# Patient Record
Sex: MALE | Race: WHITE | HISPANIC OR LATINO | Employment: FULL TIME | URBAN - METROPOLITAN AREA
[De-identification: names, ages, dates, MRNs, and addresses within clinical notes are randomized per-mention and may not be internally consistent; named-entity substitution may affect disease eponyms.]

---

## 2021-09-04 ENCOUNTER — OFFICE VISIT (OUTPATIENT)
Dept: URGENT CARE | Facility: PHYSICIAN GROUP | Age: 66
End: 2021-09-04
Payer: MEDICARE

## 2021-09-04 ENCOUNTER — HOSPITAL ENCOUNTER (OUTPATIENT)
Facility: MEDICAL CENTER | Age: 66
End: 2021-09-04
Attending: FAMILY MEDICINE
Payer: MEDICARE

## 2021-09-04 VITALS
WEIGHT: 194 LBS | HEART RATE: 95 BPM | HEIGHT: 69 IN | OXYGEN SATURATION: 94 % | DIASTOLIC BLOOD PRESSURE: 88 MMHG | RESPIRATION RATE: 16 BRPM | TEMPERATURE: 102.4 F | SYSTOLIC BLOOD PRESSURE: 138 MMHG | BODY MASS INDEX: 28.73 KG/M2

## 2021-09-04 DIAGNOSIS — J11.1 INFLUENZA: ICD-10-CM

## 2021-09-04 DIAGNOSIS — R05.9 COUGH: ICD-10-CM

## 2021-09-04 DIAGNOSIS — R50.9 FEVER, UNSPECIFIED FEVER CAUSE: ICD-10-CM

## 2021-09-04 DIAGNOSIS — Z20.822 SUSPECTED COVID-19 VIRUS INFECTION: ICD-10-CM

## 2021-09-04 LAB
FLUAV+FLUBV AG SPEC QL IA: POSITIVE
INT CON NEG: NORMAL
INT CON POS: NORMAL

## 2021-09-04 PROCEDURE — U0003 INFECTIOUS AGENT DETECTION BY NUCLEIC ACID (DNA OR RNA); SEVERE ACUTE RESPIRATORY SYNDROME CORONAVIRUS 2 (SARS-COV-2) (CORONAVIRUS DISEASE [COVID-19]), AMPLIFIED PROBE TECHNIQUE, MAKING USE OF HIGH THROUGHPUT TECHNOLOGIES AS DESCRIBED BY CMS-2020-01-R: HCPCS

## 2021-09-04 PROCEDURE — 87804 INFLUENZA ASSAY W/OPTIC: CPT | Performed by: FAMILY MEDICINE

## 2021-09-04 PROCEDURE — U0005 INFEC AGEN DETEC AMPLI PROBE: HCPCS

## 2021-09-04 PROCEDURE — 99203 OFFICE O/P NEW LOW 30 MIN: CPT | Mod: CS | Performed by: FAMILY MEDICINE

## 2021-09-04 RX ORDER — BENZONATATE 100 MG/1
100 CAPSULE ORAL 3 TIMES DAILY PRN
Qty: 30 CAPSULE | Refills: 0 | Status: SHIPPED | OUTPATIENT
Start: 2021-09-04

## 2021-09-04 RX ORDER — ATORVASTATIN CALCIUM 10 MG/1
10 TABLET, FILM COATED ORAL NIGHTLY
COMMUNITY

## 2021-09-04 RX ORDER — ASPIRIN 81 MG/1
81 TABLET, CHEWABLE ORAL DAILY
COMMUNITY

## 2021-09-04 RX ORDER — SPIRONOLACTONE 25 MG/1
25 TABLET ORAL DAILY
COMMUNITY

## 2021-09-04 RX ORDER — TOPIRAMATE 25 MG/1
25 TABLET ORAL 2 TIMES DAILY
COMMUNITY

## 2021-09-04 RX ORDER — AMITRIPTYLINE HYDROCHLORIDE 10 MG/1
10 TABLET, FILM COATED ORAL NIGHTLY
COMMUNITY

## 2021-09-04 RX ORDER — PAROXETINE 30 MG/1
40 TABLET, FILM COATED ORAL DAILY
COMMUNITY

## 2021-09-04 RX ORDER — ALLOPURINOL 100 MG/1
100 TABLET ORAL DAILY
COMMUNITY

## 2021-09-04 RX ORDER — POTASSIUM CHLORIDE 600 MG/1
8 TABLET, FILM COATED, EXTENDED RELEASE ORAL 2 TIMES DAILY
COMMUNITY

## 2021-09-04 RX ORDER — EZETIMIBE 10 MG/1
10 TABLET ORAL DAILY
COMMUNITY

## 2021-09-04 RX ORDER — OSELTAMIVIR PHOSPHATE 75 MG/1
75 CAPSULE ORAL 2 TIMES DAILY
Qty: 10 CAPSULE | Refills: 0 | Status: SHIPPED | OUTPATIENT
Start: 2021-09-04 | End: 2021-09-09

## 2021-09-04 RX ORDER — ENALAPRIL MALEATE 10 MG/1
10 TABLET ORAL DAILY
COMMUNITY

## 2021-09-04 ASSESSMENT — ENCOUNTER SYMPTOMS
FEVER: 1
COUGH: 1
DIZZINESS: 0
VOMITING: 0
CHILLS: 0
NAUSEA: 0
SORE THROAT: 0
MYALGIAS: 1
RHINORRHEA: 1
SHORTNESS OF BREATH: 0

## 2021-09-04 NOTE — PROGRESS NOTES
Subjective:   Ross Bowman is a 65 y.o. male who presents for Cough (runny nose, body aches, headache, pressure, x1 day. )        Cough  This is a new problem. The current episode started yesterday. The problem has been unchanged. The cough is non-productive. Associated symptoms include a fever, myalgias and rhinorrhea. Pertinent negatives include no chills, rash, sore throat or shortness of breath. Associated symptoms comments: There has been community-wide COVID-19 exposure, the patient denies known direct COVID-19 exposure  Patient reports complete COVID-19 vaccination.     PMH:  has no past medical history on file.  MEDS:   Current Outpatient Medications:   •  potassium chloride (KLOR-CON) 8 MEQ tablet, Take 8 mEq by mouth 2 times a day., Disp: , Rfl:   •  Fenofibrate (TRICOR PO), Take  by mouth., Disp: , Rfl:   •  ezetimibe (ZETIA) 10 MG Tab, Take 10 mg by mouth every day., Disp: , Rfl:   •  aspirin (ASA) 81 MG Chew Tab chewable tablet, Chew 81 mg every day., Disp: , Rfl:   •  spironolactone (ALDACTONE) 25 MG Tab, Take 25 mg by mouth every day., Disp: , Rfl:   •  allopurinol (ZYLOPRIM) 100 MG Tab, Take 100 mg by mouth every day., Disp: , Rfl:   •  enalapril (VASOTEC) 10 MG Tab, Take 10 mg by mouth every day., Disp: , Rfl:   •  atorvastatin (LIPITOR) 10 MG Tab, Take 10 mg by mouth every evening., Disp: , Rfl:   •  topiramate (TOPAMAX) 25 MG Tab, Take 25 mg by mouth 2 times a day., Disp: , Rfl:   •  PARoxetine (PAXIL) 30 MG Tab, Take 40 mg by mouth every day., Disp: , Rfl:   •  amitriptyline (ELAVIL) 10 MG Tab, Take 10 mg by mouth every evening., Disp: , Rfl:   •  oseltamivir (TAMIFLU) 75 MG Cap, Take 1 Capsule by mouth 2 times a day for 5 days., Disp: 10 Capsule, Rfl: 0  •  benzonatate (TESSALON) 100 MG Cap, Take 1 Capsule by mouth 3 times a day as needed for Cough., Disp: 30 Capsule, Rfl: 0  ALLERGIES:   Allergies   Allergen Reactions   • Niaspan [Niacin, Antihyperlipidemic] Hives     Fever, redness  "    SURGHX: No past surgical history on file.  SOCHX:  reports that he has never smoked. He has never used smokeless tobacco. He reports current alcohol use. He reports that he does not use drugs.  FH: No family history on file.  Review of Systems   Constitutional: Positive for fever. Negative for chills.   HENT: Positive for rhinorrhea. Negative for sore throat.    Respiratory: Positive for cough. Negative for shortness of breath.    Gastrointestinal: Negative for nausea and vomiting.   Musculoskeletal: Positive for myalgias.   Skin: Negative for rash.   Neurological: Negative for dizziness.        Objective:   /88   Pulse 95   Temp (!) 39.1 °C (102.4 °F) (Temporal)   Resp 16   Ht 1.753 m (5' 9\")   Wt 88 kg (194 lb)   SpO2 94%   BMI 28.65 kg/m²   Physical Exam  Vitals and nursing note reviewed.   Constitutional:       General: He is not in acute distress.     Appearance: He is well-developed.   HENT:      Head: Normocephalic and atraumatic.      Right Ear: External ear normal.      Left Ear: External ear normal.      Nose: Rhinorrhea present.      Mouth/Throat:      Mouth: Mucous membranes are moist.      Pharynx: Posterior oropharyngeal erythema present.   Eyes:      Conjunctiva/sclera: Conjunctivae normal.   Cardiovascular:      Rate and Rhythm: Normal rate.   Pulmonary:      Effort: Pulmonary effort is normal. No respiratory distress.      Breath sounds: Normal breath sounds. No wheezing or rhonchi.   Abdominal:      General: There is no distension.   Musculoskeletal:         General: Normal range of motion.   Skin:     General: Skin is warm and dry.   Neurological:      General: No focal deficit present.      Mental Status: He is alert and oriented to person, place, and time. Mental status is at baseline.      Gait: Gait (gait at baseline) normal.   Psychiatric:         Judgment: Judgment normal.     POCT influenza: positive         Assessment/Plan:   1. Influenza  - oseltamivir (TAMIFLU) 75 MG " Cap; Take 1 Capsule by mouth 2 times a day for 5 days.  Dispense: 10 Capsule; Refill: 0    2. Suspected COVID-19 virus infection  - SARS-CoV-2 PCR (24 hour In-House): Collect NP swab in V[facility]; Future    3. Cough  - benzonatate (TESSALON) 100 MG Cap; Take 1 Capsule by mouth 3 times a day as needed for Cough.  Dispense: 30 Capsule; Refill: 0    4. Fever, unspecified fever cause  - POCT Influenza A/B    Other orders  - potassium chloride (KLOR-CON) 8 MEQ tablet; Take 8 mEq by mouth 2 times a day.  - Fenofibrate (TRICOR PO); Take  by mouth.  - ezetimibe (ZETIA) 10 MG Tab; Take 10 mg by mouth every day.  - aspirin (ASA) 81 MG Chew Tab chewable tablet; Chew 81 mg every day.  - spironolactone (ALDACTONE) 25 MG Tab; Take 25 mg by mouth every day.  - allopurinol (ZYLOPRIM) 100 MG Tab; Take 100 mg by mouth every day.  - enalapril (VASOTEC) 10 MG Tab; Take 10 mg by mouth every day.  - atorvastatin (LIPITOR) 10 MG Tab; Take 10 mg by mouth every evening.  - topiramate (TOPAMAX) 25 MG Tab; Take 25 mg by mouth 2 times a day.  - PARoxetine (PAXIL) 30 MG Tab; Take 40 mg by mouth every day.  - amitriptyline (ELAVIL) 10 MG Tab; Take 10 mg by mouth every evening.        Medical Decision Making/Course:  In the course of preparing for this visit with review of the pertinent past medical history, recent and past clinic visits, current medications, and performing chart, immunization, medical history and medication reconciliation, and in the further course of obtaining the current history pertinent to the clinic visit today, performing an exam and evaluation, ordering and independently evaluating labs, tests, and/or procedures, prescribing any recommended new medications as noted above, providing any pertinent counseling and education and recommending further coordination of care, at least 30 minutes of total time were spent during this encounter.      Discussed close monitoring, return precautions, and supportive measures of maintaining  adequate fluid hydration and caloric intake, relative rest and symptom management as needed for pain and/or fever.    Differential diagnosis, natural history, supportive care, and indications for immediate follow-up discussed.     Advised the patient to follow-up with the primary care physician for recheck, reevaluation, and consideration of further management.    Please note that this dictation was created using voice recognition software. I have worked with consultants from the vendor as well as technical experts from [employer] [facility] [employer] to optimize the interface. I have made every reasonable attempt to correct obvious errors, but I expect that there are errors of grammar and possibly content that I did not discover before finalizing the note.

## 2021-09-05 DIAGNOSIS — Z20.822 SUSPECTED COVID-19 VIRUS INFECTION: ICD-10-CM

## 2021-09-06 LAB
COVID ORDER STATUS COVID19: NORMAL
SARS-COV-2 RNA RESP QL NAA+PROBE: DETECTED
SPECIMEN SOURCE: ABNORMAL

## 2022-11-02 ENCOUNTER — PATIENT MESSAGE (OUTPATIENT)
Dept: HEALTH INFORMATION MANAGEMENT | Facility: OTHER | Age: 67
End: 2022-11-02